# Patient Record
Sex: MALE | Race: WHITE | URBAN - METROPOLITAN AREA
[De-identification: names, ages, dates, MRNs, and addresses within clinical notes are randomized per-mention and may not be internally consistent; named-entity substitution may affect disease eponyms.]

---

## 2017-12-14 ENCOUNTER — APPOINTMENT (OUTPATIENT)
Dept: CT IMAGING | Facility: CLINIC | Age: 82
DRG: 378 | End: 2017-12-14
Attending: EMERGENCY MEDICINE
Payer: MEDICARE

## 2017-12-14 ENCOUNTER — HOSPITAL ENCOUNTER (INPATIENT)
Facility: CLINIC | Age: 82
LOS: 2 days | Discharge: HOME | DRG: 378 | End: 2017-12-16
Attending: EMERGENCY MEDICINE | Admitting: INTERNAL MEDICINE
Payer: MEDICARE

## 2017-12-14 DIAGNOSIS — K92.0 HEMATEMESIS, PRESENCE OF NAUSEA NOT SPECIFIED: Primary | ICD-10-CM

## 2017-12-14 DIAGNOSIS — K92.2 UPPER GI BLEED: ICD-10-CM

## 2017-12-14 DIAGNOSIS — K25.4 GASTROINTESTINAL HEMORRHAGE ASSOCIATED WITH GASTRIC ULCER: ICD-10-CM

## 2017-12-14 DIAGNOSIS — Z86.03 HX OF POLYCYTHEMIA VERA: ICD-10-CM

## 2017-12-14 LAB
ABO + RH BLD: NORMAL
ABO + RH BLD: NORMAL
ALBUMIN SERPL-MCNC: 3.4 G/DL (ref 3.4–5)
ALP SERPL-CCNC: 64 U/L (ref 40–150)
ALT SERPL W P-5'-P-CCNC: 23 U/L (ref 0–70)
ANION GAP SERPL CALCULATED.3IONS-SCNC: 8 MMOL/L (ref 3–14)
AST SERPL W P-5'-P-CCNC: 16 U/L (ref 0–45)
BASOPHILS # BLD AUTO: 0.1 10E9/L (ref 0–0.2)
BASOPHILS NFR BLD AUTO: 0.4 %
BILIRUB DIRECT SERPL-MCNC: <0.1 MG/DL (ref 0–0.2)
BILIRUB SERPL-MCNC: 0.4 MG/DL (ref 0.2–1.3)
BLD GP AB SCN SERPL QL: NORMAL
BLOOD BANK CMNT PATIENT-IMP: NORMAL
BUN SERPL-MCNC: 58 MG/DL (ref 7–30)
CALCIUM SERPL-MCNC: 8.9 MG/DL (ref 8.5–10.1)
CHLORIDE SERPL-SCNC: 106 MMOL/L (ref 94–109)
CO2 SERPL-SCNC: 27 MMOL/L (ref 20–32)
CREAT SERPL-MCNC: 0.89 MG/DL (ref 0.66–1.25)
DIFFERENTIAL METHOD BLD: ABNORMAL
EOSINOPHIL # BLD AUTO: 0.5 10E9/L (ref 0–0.7)
EOSINOPHIL NFR BLD AUTO: 1.4 %
ERYTHROCYTE [DISTWIDTH] IN BLOOD BY AUTOMATED COUNT: 17.3 % (ref 10–15)
GFR SERPL CREATININE-BSD FRML MDRD: 79 ML/MIN/1.7M2
GLUCOSE BLDC GLUCOMTR-MCNC: 162 MG/DL (ref 70–99)
GLUCOSE SERPL-MCNC: 211 MG/DL (ref 70–99)
HCT VFR BLD AUTO: 39.7 % (ref 40–53)
HGB BLD-MCNC: 11.6 G/DL (ref 13.3–17.7)
IMM GRANULOCYTES # BLD: 0.7 10E9/L (ref 0–0.4)
IMM GRANULOCYTES NFR BLD: 2.1 %
INR PPP: 3.19 (ref 0.86–1.14)
LACTATE BLD-SCNC: 1.8 MMOL/L (ref 0.7–2)
LYMPHOCYTES # BLD AUTO: 2.5 10E9/L (ref 0.8–5.3)
LYMPHOCYTES NFR BLD AUTO: 7.5 %
MCH RBC QN AUTO: 20.8 PG (ref 26.5–33)
MCHC RBC AUTO-ENTMCNC: 29.2 G/DL (ref 31.5–36.5)
MCV RBC AUTO: 71 FL (ref 78–100)
MONOCYTES # BLD AUTO: 1.4 10E9/L (ref 0–1.3)
MONOCYTES NFR BLD AUTO: 4.2 %
NEUTROPHILS # BLD AUTO: 27.7 10E9/L (ref 1.6–8.3)
NEUTROPHILS NFR BLD AUTO: 84.4 %
NRBC # BLD AUTO: 0 10*3/UL
NRBC BLD AUTO-RTO: 0 /100
PLATELET # BLD AUTO: 764 10E9/L (ref 150–450)
POTASSIUM SERPL-SCNC: 5 MMOL/L (ref 3.4–5.3)
PROT SERPL-MCNC: 6.6 G/DL (ref 6.8–8.8)
RBC # BLD AUTO: 5.58 10E12/L (ref 4.4–5.9)
SODIUM SERPL-SCNC: 141 MMOL/L (ref 133–144)
SPECIMEN EXP DATE BLD: NORMAL
WBC # BLD AUTO: 32.8 10E9/L (ref 4–11)

## 2017-12-14 PROCEDURE — 86901 BLOOD TYPING SEROLOGIC RH(D): CPT | Performed by: EMERGENCY MEDICINE

## 2017-12-14 PROCEDURE — 25000128 H RX IP 250 OP 636: Performed by: INTERNAL MEDICINE

## 2017-12-14 PROCEDURE — 80076 HEPATIC FUNCTION PANEL: CPT | Performed by: EMERGENCY MEDICINE

## 2017-12-14 PROCEDURE — 96365 THER/PROPH/DIAG IV INF INIT: CPT

## 2017-12-14 PROCEDURE — 96361 HYDRATE IV INFUSION ADD-ON: CPT

## 2017-12-14 PROCEDURE — 74177 CT ABD & PELVIS W/CONTRAST: CPT

## 2017-12-14 PROCEDURE — 25000125 ZZHC RX 250: Performed by: EMERGENCY MEDICINE

## 2017-12-14 PROCEDURE — 86850 RBC ANTIBODY SCREEN: CPT | Performed by: EMERGENCY MEDICINE

## 2017-12-14 PROCEDURE — 86900 BLOOD TYPING SEROLOGIC ABO: CPT | Performed by: EMERGENCY MEDICINE

## 2017-12-14 PROCEDURE — 00000146 ZZHCL STATISTIC GLUCOSE BY METER IP

## 2017-12-14 PROCEDURE — 99223 1ST HOSP IP/OBS HIGH 75: CPT | Mod: AI | Performed by: INTERNAL MEDICINE

## 2017-12-14 PROCEDURE — 80048 BASIC METABOLIC PNL TOTAL CA: CPT | Performed by: EMERGENCY MEDICINE

## 2017-12-14 PROCEDURE — 99285 EMERGENCY DEPT VISIT HI MDM: CPT | Mod: 25

## 2017-12-14 PROCEDURE — 85025 COMPLETE CBC W/AUTO DIFF WBC: CPT | Performed by: EMERGENCY MEDICINE

## 2017-12-14 PROCEDURE — 12000000 ZZH R&B MED SURG/OB

## 2017-12-14 PROCEDURE — 25000128 H RX IP 250 OP 636

## 2017-12-14 PROCEDURE — 83605 ASSAY OF LACTIC ACID: CPT | Performed by: EMERGENCY MEDICINE

## 2017-12-14 PROCEDURE — 96375 TX/PRO/DX INJ NEW DRUG ADDON: CPT

## 2017-12-14 PROCEDURE — 85610 PROTHROMBIN TIME: CPT | Performed by: EMERGENCY MEDICINE

## 2017-12-14 PROCEDURE — 25000128 H RX IP 250 OP 636: Performed by: EMERGENCY MEDICINE

## 2017-12-14 RX ORDER — POTASSIUM CHLORIDE 1.5 G/1.58G
20-40 POWDER, FOR SOLUTION ORAL
Status: DISCONTINUED | OUTPATIENT
Start: 2017-12-14 | End: 2017-12-16 | Stop reason: HOSPADM

## 2017-12-14 RX ORDER — AMOXICILLIN 250 MG
2 CAPSULE ORAL 2 TIMES DAILY PRN
Status: DISCONTINUED | OUTPATIENT
Start: 2017-12-14 | End: 2017-12-16 | Stop reason: HOSPADM

## 2017-12-14 RX ORDER — MORPHINE SULFATE 2 MG/ML
1 INJECTION, SOLUTION INTRAMUSCULAR; INTRAVENOUS
Status: DISCONTINUED | OUTPATIENT
Start: 2017-12-14 | End: 2017-12-16 | Stop reason: HOSPADM

## 2017-12-14 RX ORDER — ACETAMINOPHEN 325 MG/1
650 TABLET ORAL EVERY 4 HOURS PRN
Status: DISCONTINUED | OUTPATIENT
Start: 2017-12-14 | End: 2017-12-16 | Stop reason: HOSPADM

## 2017-12-14 RX ORDER — POTASSIUM CL/LIDO/0.9 % NACL 10MEQ/0.1L
10 INTRAVENOUS SOLUTION, PIGGYBACK (ML) INTRAVENOUS
Status: DISCONTINUED | OUTPATIENT
Start: 2017-12-14 | End: 2017-12-16 | Stop reason: HOSPADM

## 2017-12-14 RX ORDER — ONDANSETRON 4 MG/1
4 TABLET, ORALLY DISINTEGRATING ORAL EVERY 6 HOURS PRN
Status: DISCONTINUED | OUTPATIENT
Start: 2017-12-14 | End: 2017-12-16 | Stop reason: HOSPADM

## 2017-12-14 RX ORDER — LABETALOL HYDROCHLORIDE 5 MG/ML
10 INJECTION, SOLUTION INTRAVENOUS
Status: DISCONTINUED | OUTPATIENT
Start: 2017-12-14 | End: 2017-12-16 | Stop reason: HOSPADM

## 2017-12-14 RX ORDER — DEXTROSE MONOHYDRATE 25 G/50ML
25-50 INJECTION, SOLUTION INTRAVENOUS
Status: DISCONTINUED | OUTPATIENT
Start: 2017-12-14 | End: 2017-12-16 | Stop reason: HOSPADM

## 2017-12-14 RX ORDER — ONDANSETRON 2 MG/ML
4 INJECTION INTRAMUSCULAR; INTRAVENOUS EVERY 6 HOURS PRN
Status: DISCONTINUED | OUTPATIENT
Start: 2017-12-14 | End: 2017-12-16 | Stop reason: HOSPADM

## 2017-12-14 RX ORDER — POLYETHYLENE GLYCOL 3350 17 G/17G
17 POWDER, FOR SOLUTION ORAL DAILY PRN
Status: DISCONTINUED | OUTPATIENT
Start: 2017-12-14 | End: 2017-12-16 | Stop reason: HOSPADM

## 2017-12-14 RX ORDER — SODIUM CHLORIDE 9 MG/ML
INJECTION, SOLUTION INTRAVENOUS CONTINUOUS
Status: DISCONTINUED | OUTPATIENT
Start: 2017-12-14 | End: 2017-12-16

## 2017-12-14 RX ORDER — NICOTINE POLACRILEX 4 MG
15-30 LOZENGE BUCCAL
Status: DISCONTINUED | OUTPATIENT
Start: 2017-12-14 | End: 2017-12-16 | Stop reason: HOSPADM

## 2017-12-14 RX ORDER — NALOXONE HYDROCHLORIDE 0.4 MG/ML
.1-.4 INJECTION, SOLUTION INTRAMUSCULAR; INTRAVENOUS; SUBCUTANEOUS
Status: DISCONTINUED | OUTPATIENT
Start: 2017-12-14 | End: 2017-12-16 | Stop reason: HOSPADM

## 2017-12-14 RX ORDER — POTASSIUM CHLORIDE 7.45 MG/ML
10 INJECTION INTRAVENOUS
Status: DISCONTINUED | OUTPATIENT
Start: 2017-12-14 | End: 2017-12-16 | Stop reason: HOSPADM

## 2017-12-14 RX ORDER — POTASSIUM CHLORIDE 1500 MG/1
20-40 TABLET, EXTENDED RELEASE ORAL
Status: DISCONTINUED | OUTPATIENT
Start: 2017-12-14 | End: 2017-12-16 | Stop reason: HOSPADM

## 2017-12-14 RX ORDER — AMOXICILLIN 250 MG
1 CAPSULE ORAL 2 TIMES DAILY PRN
Status: DISCONTINUED | OUTPATIENT
Start: 2017-12-14 | End: 2017-12-16 | Stop reason: HOSPADM

## 2017-12-14 RX ORDER — PROCHLORPERAZINE MALEATE 5 MG
5 TABLET ORAL EVERY 6 HOURS PRN
Status: DISCONTINUED | OUTPATIENT
Start: 2017-12-14 | End: 2017-12-16 | Stop reason: HOSPADM

## 2017-12-14 RX ORDER — IOPAMIDOL 755 MG/ML
75 INJECTION, SOLUTION INTRAVASCULAR ONCE
Status: COMPLETED | OUTPATIENT
Start: 2017-12-14 | End: 2017-12-14

## 2017-12-14 RX ORDER — ACETAMINOPHEN 650 MG/1
650 SUPPOSITORY RECTAL EVERY 4 HOURS PRN
Status: DISCONTINUED | OUTPATIENT
Start: 2017-12-14 | End: 2017-12-16 | Stop reason: HOSPADM

## 2017-12-14 RX ORDER — PROCHLORPERAZINE 25 MG
12.5 SUPPOSITORY, RECTAL RECTAL EVERY 12 HOURS PRN
Status: DISCONTINUED | OUTPATIENT
Start: 2017-12-14 | End: 2017-12-16 | Stop reason: HOSPADM

## 2017-12-14 RX ORDER — METOPROLOL TARTRATE 1 MG/ML
2.5 INJECTION, SOLUTION INTRAVENOUS EVERY 4 HOURS PRN
Status: DISCONTINUED | OUTPATIENT
Start: 2017-12-14 | End: 2017-12-16 | Stop reason: HOSPADM

## 2017-12-14 RX ORDER — POTASSIUM CHLORIDE 29.8 MG/ML
20 INJECTION INTRAVENOUS
Status: DISCONTINUED | OUTPATIENT
Start: 2017-12-14 | End: 2017-12-14 | Stop reason: RX

## 2017-12-14 RX ADMIN — PANTOPRAZOLE SODIUM 40 MG: 40 INJECTION, POWDER, FOR SOLUTION INTRAVENOUS at 20:15

## 2017-12-14 RX ADMIN — IOPAMIDOL 75 ML: 755 INJECTION, SOLUTION INTRAVENOUS at 21:04

## 2017-12-14 RX ADMIN — SODIUM CHLORIDE 63 ML: 9 INJECTION, SOLUTION INTRAVENOUS at 21:05

## 2017-12-14 RX ADMIN — SODIUM CHLORIDE 1000 ML: 9 INJECTION, SOLUTION INTRAVENOUS at 19:54

## 2017-12-14 RX ADMIN — SODIUM CHLORIDE: 9 INJECTION, SOLUTION INTRAVENOUS at 22:40

## 2017-12-14 RX ADMIN — PHYTONADIONE 2 MG: 2 INJECTION, EMULSION INTRAMUSCULAR; INTRAVENOUS; SUBCUTANEOUS at 21:46

## 2017-12-14 ASSESSMENT — ENCOUNTER SYMPTOMS
HEADACHES: 0
SHORTNESS OF BREATH: 1
NAUSEA: 0
DIARRHEA: 1
VOMITING: 1
FEVER: 0
ABDOMINAL PAIN: 1
DIAPHORESIS: 1

## 2017-12-14 NOTE — IP AVS SNAPSHOT
MRN:[id number]                      After Visit Summary   12/14/2017    Dagoberto Rankin    MRN: [id number]           Thank you!     Thank you for choosing [facility] for your care. Our goal is always to provide you with excellent care. Hearing back from our patients is one way we can continue to improve our services. Please take a few minutes to complete the written survey that you may receive in the mail after you visit with us. Thank you!        Patient Information     Date Of Birth          8/11/1920        Designated Caregiver       Most Recent Value    Caregiver    Will someone help with your care after discharge? yes    Name of designated caregiver Coleman    Phone number of caregiver [phone]    Caregiver address [place]      About your hospital stay     You were admitted on:  December 14, 2017 You last received care in the:  [address]    You were discharged on:  December 16, 2017        Reason for your hospital stay       For treatment of upper intestinal bleeding due to a gastric ulcer.                  Who to Call     For medical emergencies, please call 911.  For non-urgent questions about your medical care, please call your primary care provider or clinic, None  For questions related to your surgery, please call your surgery clinic        Attending Provider     Provider Specialty    Madhav Mathur MD Emergency Medicine    Waleska Keene MD Internal Medicine       Primary Care Provider Fax #    Provider Not In System [phone]      After Care Instructions     Activity       Your activity upon discharge: activity as tolerated            Diet       Follow this diet upon discharge: Orders Placed This Encounter      Advance Diet as Tolerated: Full Liquid Diet                  Follow-up Appointments     Follow-up and recommended labs and tests        Follow up with primary care provider, Provider Not In System, within 7 days for hospital follow- up.  The following  "labs/tests are recommended: CBC.                  Further instructions from your care team       Can resume regular diet if he tolerates full liquids well.     Pending Results     Date and Time Order Name Status Description    12/15/2017 0420 EKG 12-lead, tracing only Preliminary             Statement of Approval     Ordered          17 0919  I have reviewed and agree with all the recommendations and orders detailed in this document.  EFFECTIVE NOW     Approved and electronically signed by:  Naveen Morejon MD             Admission Information     Date & Time Provider Department Dept. Phone    2017 Waleska Keene MD [place] [address] Oncology [phone]      Your Vitals Were     Blood Pressure Pulse Temperature Respirations Height Weight    130/66 (BP Location: Left arm) 82 96.6  F (35.9  C) (Oral) 16 1.6 m (5' 3\") 68 kg (150 lb)    Pulse Oximetry BMI (Body Mass Index)                95% 26.57 kg/m2          MyChart Information     [facility] lets you send messages to your doctor, view your test results, renew your prescriptions, schedule appointments and more. To sign up, go to [email or web address] . Click on \"Log in\" on the left side of the screen, which will take you to the Welcome page. Then click on \"Sign up Now\" on the right side of the page.     You will be asked to enter the access code listed below, as well as some personal information. Please follow the directions to create your username and password.     Your access code is: [email or web address]  Expires: 3/16/2018 10:16 AM     Your access code will  in 90 days. If you need help or a new code, please call your [facility] clinic or [phone].        Care EveryWhere ID     This is your Care EveryWhere ID. This could be used by other organizations to access your [facility] medical records  [id number]        Equal Access to Services     ALEA LOTT AH: Judith Griffiths, johnsonda patty, qaybta kakinza anthony, kamila " david monreal alice sweeney'aan ah. So [facility] [phone].    ATENCIÓN: Si habla sherieañol, tiene a hill disposición servicios gratuitos de asistencia lingüística. [place] al [phone].    We comply with applicable federal civil rights laws and [place] laws. We do not discriminate on the basis of race, color, national origin, age, disability, sex, sexual orientation, or gender identity.               Review of your medicines      START taking        Dose / Directions    pantoprazole 40 MG EC tablet   Commonly known as:  PROTONIX   Used for:  Gastrointestinal hemorrhage associated with gastric ulcer        Dose:  40 mg   Take 1 tablet (40 mg) by mouth every morning   Quantity:  30 tablet   Refills:  0         CONTINUE these medicines which have NOT CHANGED        Dose / Directions    METFORMIN HCL PO        Dose:  500 mg   Take 500 mg by mouth daily (with breakfast)   Refills:  0       PROSCAR PO        Dose:  5 mg   Take 5 mg by mouth daily   Refills:  0       TOPROL XL PO        Dose:  25 mg   Take 25 mg by mouth daily   Refills:  0       VITAMIN D (CHOLECALCIFEROL) PO        Dose:  2000 Units   Take 2,000 Units by mouth daily   Refills:  0       ZOLOFT PO        Dose:  50 mg   Take 50 mg by mouth   Refills:  0         STOP taking     aspirin 81 MG EC tablet           COUMADIN PO                Where to get your medicines      These medications were sent to [facility] - [address]  [address], [place] [address]     Phone:  [phone]     pantoprazole 40 MG EC tablet                Protect others around you: Learn how to safely use, store and throw away your medicines at www.disposemymeds.org.             Medication List: This is a list of all your medications and when to take them. Check marks below indicate your daily home schedule. Keep this list as a reference.      Medications           Morning Afternoon Evening Bedtime As Needed    METFORMIN HCL PO   Take 500  mg by mouth daily (with breakfast)   Next Dose Due:  Resume home meds                                pantoprazole 40 MG EC tablet   Commonly known as:  PROTONIX   Take 1 tablet (40 mg) by mouth every morning   Last time this was given:  40 mg on 12/16/2017  9:52 AM   Next Dose Due:  12/17/17                                   PROSCAR PO   Take 5 mg by mouth daily   Next Dose Due:  Resume home meds                                TOPROL XL PO   Take 25 mg by mouth daily   Next Dose Due:  Resume home meds                                VITAMIN D (CHOLECALCIFEROL) PO   Take 2,000 Units by mouth daily   Next Dose Due:  Resume home meds                                ZOLOFT PO   Take 50 mg by mouth   Last time this was given:  50 mg on 12/16/2017  9:52 AM   Next Dose Due:  12/17/17                                             More Information        Discharge Instructions: Eating a Full Liquid Diet  Your healthcare provider prescribed a full liquid diet temporarily for you. You may have trouble swallowing solid foods. Or, you may have had surgery and not be ready for solid food or need to advance to solid foods gradually. Here's what you need to know about this type of diet.  Home care    Remember, this diet is temporary. You should not follow this diet longer than directed because it doesn t provide you with enough energy or protein.    Contact your healthcare provider if you are on this diet for more than 5 days. You may need nutritional or vitamin supplements.    Keep track of the amount of liquid that you drink and anything you eat while on this diet. Keep a log for your healthcare provider.  Choose these foods    Choose fruit juices without pulp, such as apple juice, grape juice, cranberry juice, and nectars.    Choose drinks such as coffee, tea (hot or cold), fruit flavored drinks, soda, water, milk (whole, skim, 1%, and 2%), cream, instant breakfast drinks, and liquid meal replacements.    Choose desserts and  snacks such as fruit ices (without chunks of fruit), plain or vanilla yogurt, plain gelatin, hard candy, Popsicles made from juices, custards, frozen yogurt, smoothies without chunks, ice cream (without nuts or candy), and pudding.    Choose broth, bouillon, fat-free consommé, or strained cream soups.    Choose thin, refined hot cereals, such as porridge, and grits.  Don't eat these foods    Don t eat canned, fresh, or frozen fruit.    Don t eat soup with vegetables, noodles, rice, meat, or other chunks of food in it.    Don t eat vegetables, bread, whole cereal and grain products, meat, chicken, fish, meat substitutes (nuts, tofu, etc.), oils, butter, or margarine.  Follow-up  Make a follow-up appointment, or as advised.     When to call your healthcare provider  Call your healthcare provider right away if you have any of the following:    Fever of 100.4 F (38.0 C) or higher    Diarrhea that lasts for more than 1 day    Vomiting that does not stop    Trouble urinating    Trouble passing gas    Abdominal pain with bloating and cramping   Date Last Reviewed: 6/1/2017 2000-2017 [employer]. [address] [place] [address]. All rights reserved. This information is not intended as a substitute for professional medical care. Always follow your healthcare professional's instructions.                Full Liquid Diet  A full liquid diet is a middle step between a clear liquid diet and eating solid foods. A clear liquid diet allows only liquids you can see through. A full liquid diet allows thicker, liquid foods as listed below. It can be anything that is liquid at room temperature.  The full liquid diet may be used before or after surgery. It is also used before some medical tests. It is easy to digest and leaves little food in the stomach and intestines.  A full liquid diet meets calorie and protein needs for your body with liquids only. It should not be used for more than 5 days. Your healthcare  provider or dietitian may also order high-protein, high-calorie liquid supplements. These will give you extra vitamins and minerals. You may include the items below on a full liquid diet.    Adults  Adults should drink a total of 2 to 3 quarts of liquid per day. It may be easier to drink small, frequent servings rather than a few large ones. People with severe kidney or heart disease can drink only limited amounts of fluids. Check with your provider.    Cereals and soups. Creamy, hot breakfast cereals (wheat or rice), pureed soups (including pureed meats, bland vegetables, and white potatoes), tomato puree.    Desserts. Gelatin, whipped topping, custard-style yogurt, pudding, custard, plain ice cream, sherbet, sorbet, popsicles, fruit juice bars.    Drinks. Coffee, tea, cream, milk, milkshakes, fruit and vegetable juices, sodas, mineral water (plain or flavored), liquid gelatin, electrolyte replacement sport drinks.    Other items. Salt, mild-flavored seasonings, chocolate flavoring, gravy, margarine, sugar, syrup, jelly, honey, hard candy (to suck on).  Children  Follow the healthcare provider s instructions. Young children who are eating solid foods may be able to have the items listed above without problems. Be sure to follow these safety measures:    Don't give hard candies to young children. The candies may cause choking.    Children under 1 year old. Do not give honey. It may cause illness.    Children under 2 years old. Ask your child s provider if you should supplement your child s diet with oral rehydration solutions, which have electrolytes. You can buy these drinks at pharmacies and grocery stores. You don t need a prescription.    Children over 1 year old. Limit milk to 3 or 4 cups per day. Too much milk can make your child less hungry for other foods.  Date Last Reviewed: 8/1/2016 2000-2017 [employer]. [address] [place] [address], [place] [address]. All rights reserved. This information is  not intended as a substitute for professional medical care. Always follow your healthcare professional's instructions.                Upper GI Bleeding (Stable)  Your upper gastrointestinal (GI) tract includes your esophagus, stomach, and upper small intestine. You have signs of bleeding from your upper GI tract. You may have vomited or coughed up blood or coffee-ground like material. Or you may have black or tarry stools. Very small amounts of GI bleeding may not be visible and can only be found by a test of the stool.  Causes of upper GI bleeding can include:    Tear in the lining of the esophagus    Enlarged veins in the esophagus    An ulcer in the stomach or top of the small intestine    Severe irritation of the stomach    Inflammation of the digestive tract  Note: A bloody nose or mouth or dental problems may cause blood to be swallowed and vomited up again. This is not true GI bleeding. Iron supplements and medicines for diarrhea and upset stomach can cause black stools. This is not GI bleeding and is not a cause for concern.  Home care  Depending on the cause of your bleeding, care may include the following:    You may be given medicines to help protect your GI tract, treat your problem, and promote healing. Take these as directed.    Avoid NSAIDs, such as aspirin, ibuprofen, or naproxen. They can irritate the stomach and cause further bleeding. If you are taking these medicines for other medical reasons, talk to your healthcare provider before you stop them.      Avoid alcohol, caffeine, and tobacco, which can delay healing and worsen your problem.  Follow-up care  Follow up with your healthcare provider as advised. Further tests may need to be done to find the cause of your bleeding.  When to seek medical advice  Call your healthcare provider for any of the following:    Stomach pain appears or gets worse    Pain spreads to the neck, back, shoulder, or arm    Weakness or dizziness    Swelling of your  abdomen    Red blood in your stool    Fever of 100.4F (38C) or higher, or as directed by your healthcare provider  Call 911  Get emergency medical care if any of these occur:    Trouble breathing or swallowing    Severe dizziness    Loss of consciousness    Vomiting blood or large amounts of blood in the stool  Date Last Reviewed: 6/24/2015 2000-2017 [employer]. [address] [place], [place] [address]. All rights reserved. This information is not intended as a substitute for professional medical care. Always follow your healthcare professional's instructions.                Understanding Gastric Ulcers    A gastric ulcer is an open sore in the stomach lining. It is sometimes called a peptic ulcer. This is a more general term for ulcers that may be in the stomach or the upper part of the small intestine. Ulcers can cause pain. But they may also have no symptoms for a long time.  What causes gastric ulcers?  Gastric ulcers have a few common causes. To find the cause of your ulcer, your healthcare provider will give you an exam and take your health history. He or she may also order some tests. The main causes of gastric ulcers include:    Infection with the H. pylori (Helicobacter pylori) bacteria. This damages the stomach lining. Digestive juices can then harm the digestive tract.    Long-term use of some over-the-counter pain medicines. This reduces the body s ability to protect the stomach from damage.  Other causes of gastric ulcers include:    Heavy alcohol use    Having a family history of ulcers    In rare cases, a tumor in the digestive tract may cause an ulcer  Symptoms of a gastric ulcer  Ulcer symptoms may appear and then go away for a time. Symptoms of a gastric ulcer may include:    Stomach pain, often a dull or burning feeling toward the top of your belly    Feeling full or bloated    Heartburn or acid reflux    Upset stomach (nausea) or vomiting    Vomiting blood    Lack of  appetite    Weight loss    Black stool    Red blood in the stool  Treatment for a gastric ulcer  Treatment for gastric ulcers may depend on what is causing them. Treatment may include:    Not using over-the-counter medicines. You will likely need to stop taking these medicines. But in some cases these medicines can t be safely stopped. Check with your healthcare provider to see what is best for you.     Taking medicines to ease symptoms. These medicines may help to reduce the amount of acid your stomach makes. They also may help coat your stomach lining.    Taking antibiotics. If your ulcer was caused by H. pylori, your provider will likely prescribe antibiotics to get rid of the infection.    Having an endoscopy. This is often done to check the stomach and diagnose the ulcer. In some cases it can also treat the ulcer. It involves passing a flexible tube through your mouth into your stomach and small intestine.    Using a tube (catheter) to stop the bleeding. A thin tube is passed into one of your blood vessels. Special tools are used to help stop the bleeding.    Having surgery. You often may need this if the ulcer has caused severe symptoms.  Making some lifestyle changes can reduce ulcer symptoms. It may also prevent more damage to your digestive tract. These changes include:    Not taking over-the-counter pain medicines. Talk with your provider about using another type of pain reliever.    Not taking aspirin unless your provider has advised it    Limiting the amount of alcohol you drink    Quitting smoking  Possible complications of a gastric ulcer  Gastric ulcers can have serious complications. These can include:    Bleeding into the stomach    A hole (perforation) in the stomach    A blockage that interferes with food moving from your stomach to the small intestine  An ongoing infection with H. pylori may be a risk factor for stomach cancer. This is one reason it is important to get rid of this bacteria.  When  to call your healthcare provider  Call your healthcare provider right away if you have any of these:    Vomiting blood, or vomit that looks like coffee grounds    Bloody, black, or tarry-looking stools    Fever of 100.4 F (38 C) or higher, or as directed    Pain that gets worse    Symptoms that don t get better with treatment, or symptoms that get worse    New symptoms   Date Last Reviewed: 5/1/2016 2000-2017 [employer]. [address] [place] [address], [place] [address]. All rights reserved. This information is not intended as a substitute for professional medical care. Always follow your healthcare professional's instructions.

## 2017-12-14 NOTE — IP AVS SNAPSHOT
[address], [address]    [facility] [address]    Phone:  [phone]                                       After Visit Summary   12/14/2017    Dagoberto Rankin    MRN: [id number]           After Visit Summary Signature Page     I have received my discharge instructions, and my questions have been answered. I have discussed any challenges I see with this plan with the nurse or doctor.    ..........................................................................................................................................  Patient/Patient Representative Signature      ..........................................................................................................................................  Patient Representative Print Name and Relationship to Patient    ..................................................               ................................................  Date                                            Time    ..........................................................................................................................................  Reviewed by Signature/Title    ...................................................              ..............................................  Date                                                            Time

## 2017-12-15 ENCOUNTER — SURGERY (OUTPATIENT)
Age: 82
End: 2017-12-15

## 2017-12-15 LAB
ANION GAP SERPL CALCULATED.3IONS-SCNC: 7 MMOL/L (ref 3–14)
BUN SERPL-MCNC: 57 MG/DL (ref 7–30)
CALCIUM SERPL-MCNC: 8.5 MG/DL (ref 8.5–10.1)
CHLORIDE SERPL-SCNC: 113 MMOL/L (ref 94–109)
CO2 SERPL-SCNC: 24 MMOL/L (ref 20–32)
CREAT SERPL-MCNC: 0.72 MG/DL (ref 0.66–1.25)
ERYTHROCYTE [DISTWIDTH] IN BLOOD BY AUTOMATED COUNT: 17 % (ref 10–15)
GFR SERPL CREATININE-BSD FRML MDRD: >90 ML/MIN/1.7M2
GLUCOSE BLDC GLUCOMTR-MCNC: 137 MG/DL (ref 70–99)
GLUCOSE BLDC GLUCOMTR-MCNC: 148 MG/DL (ref 70–99)
GLUCOSE BLDC GLUCOMTR-MCNC: 158 MG/DL (ref 70–99)
GLUCOSE BLDC GLUCOMTR-MCNC: 161 MG/DL (ref 70–99)
GLUCOSE BLDC GLUCOMTR-MCNC: 172 MG/DL (ref 70–99)
GLUCOSE BLDC GLUCOMTR-MCNC: 223 MG/DL (ref 70–99)
GLUCOSE SERPL-MCNC: 159 MG/DL (ref 70–99)
HBA1C MFR BLD: 7 % (ref 4.3–6)
HCT VFR BLD AUTO: 39.8 % (ref 40–53)
HGB BLD-MCNC: 10.2 G/DL (ref 13.3–17.7)
HGB BLD-MCNC: 10.7 G/DL (ref 13.3–17.7)
HGB BLD-MCNC: 11.8 G/DL (ref 13.3–17.7)
HGB BLD-MCNC: 9.9 G/DL (ref 13.3–17.7)
INR PPP: 1.56 (ref 0.86–1.14)
MCH RBC QN AUTO: 20.6 PG (ref 26.5–33)
MCHC RBC AUTO-ENTMCNC: 29.6 G/DL (ref 31.5–36.5)
MCV RBC AUTO: 70 FL (ref 78–100)
PLATELET # BLD AUTO: 620 10E9/L (ref 150–450)
POTASSIUM SERPL-SCNC: 4.4 MMOL/L (ref 3.4–5.3)
RBC # BLD AUTO: 5.73 10E12/L (ref 4.4–5.9)
SODIUM SERPL-SCNC: 144 MMOL/L (ref 133–144)
UPPER GI ENDOSCOPY: NORMAL
WBC # BLD AUTO: 27.8 10E9/L (ref 4–11)

## 2017-12-15 PROCEDURE — 25000128 H RX IP 250 OP 636: Performed by: INTERNAL MEDICINE

## 2017-12-15 PROCEDURE — 00000146 ZZHCL STATISTIC GLUCOSE BY METER IP

## 2017-12-15 PROCEDURE — 85610 PROTHROMBIN TIME: CPT | Performed by: INTERNAL MEDICINE

## 2017-12-15 PROCEDURE — 25000131 ZZH RX MED GY IP 250 OP 636 PS 637: Performed by: INTERNAL MEDICINE

## 2017-12-15 PROCEDURE — 85027 COMPLETE CBC AUTOMATED: CPT | Performed by: INTERNAL MEDICINE

## 2017-12-15 PROCEDURE — 83036 HEMOGLOBIN GLYCOSYLATED A1C: CPT | Performed by: INTERNAL MEDICINE

## 2017-12-15 PROCEDURE — 93010 ELECTROCARDIOGRAM REPORT: CPT | Performed by: INTERNAL MEDICINE

## 2017-12-15 PROCEDURE — 25000132 ZZH RX MED GY IP 250 OP 250 PS 637: Performed by: INTERNAL MEDICINE

## 2017-12-15 PROCEDURE — 99233 SBSQ HOSP IP/OBS HIGH 50: CPT | Performed by: HOSPITALIST

## 2017-12-15 PROCEDURE — 25000125 ZZHC RX 250: Performed by: INTERNAL MEDICINE

## 2017-12-15 PROCEDURE — 43255 EGD CONTROL BLEEDING ANY: CPT | Performed by: INTERNAL MEDICINE

## 2017-12-15 PROCEDURE — 40000275 ZZH STATISTIC RCP TIME EA 10 MIN

## 2017-12-15 PROCEDURE — G0500 MOD SEDAT ENDO SERVICE >5YRS: HCPCS | Performed by: INTERNAL MEDICINE

## 2017-12-15 PROCEDURE — 93005 ELECTROCARDIOGRAM TRACING: CPT

## 2017-12-15 PROCEDURE — 12000000 ZZH R&B MED SURG/OB

## 2017-12-15 PROCEDURE — 80048 BASIC METABOLIC PNL TOTAL CA: CPT | Performed by: INTERNAL MEDICINE

## 2017-12-15 PROCEDURE — 0W3P8ZZ CONTROL BLEEDING IN GASTROINTESTINAL TRACT, VIA NATURAL OR ARTIFICIAL OPENING ENDOSCOPIC: ICD-10-PCS | Performed by: INTERNAL MEDICINE

## 2017-12-15 PROCEDURE — 36415 COLL VENOUS BLD VENIPUNCTURE: CPT | Performed by: INTERNAL MEDICINE

## 2017-12-15 PROCEDURE — 85018 HEMOGLOBIN: CPT | Performed by: INTERNAL MEDICINE

## 2017-12-15 RX ORDER — LIDOCAINE 40 MG/G
CREAM TOPICAL
Status: DISCONTINUED | OUTPATIENT
Start: 2017-12-15 | End: 2017-12-15 | Stop reason: HOSPADM

## 2017-12-15 RX ADMIN — PANTOPRAZOLE SODIUM 40 MG: 40 INJECTION, POWDER, FOR SOLUTION INTRAVENOUS at 20:00

## 2017-12-15 RX ADMIN — MIDAZOLAM 1 MG: 1 INJECTION INTRAMUSCULAR; INTRAVENOUS at 17:06

## 2017-12-15 RX ADMIN — INSULIN ASPART 2 UNITS: 100 INJECTION, SOLUTION INTRAVENOUS; SUBCUTANEOUS at 19:55

## 2017-12-15 RX ADMIN — SERTRALINE HYDROCHLORIDE 50 MG: 50 TABLET ORAL at 08:49

## 2017-12-15 RX ADMIN — SODIUM CHLORIDE: 9 INJECTION, SOLUTION INTRAVENOUS at 03:37

## 2017-12-15 RX ADMIN — SODIUM CHLORIDE: 9 INJECTION, SOLUTION INTRAVENOUS at 13:52

## 2017-12-15 RX ADMIN — PANTOPRAZOLE SODIUM 40 MG: 40 INJECTION, POWDER, FOR SOLUTION INTRAVENOUS at 08:49

## 2017-12-15 ASSESSMENT — ACTIVITIES OF DAILY LIVING (ADL)
RETIRED_COMMUNICATION: 0-->UNDERSTANDS/COMMUNICATES WITHOUT DIFFICULTY
COGNITION: 0 - NO COGNITION ISSUES REPORTED
SWALLOWING: 0-->SWALLOWS FOODS/LIQUIDS WITHOUT DIFFICULTY
AMBULATION: 2-->ASSISTIVE PERSON
TOILETING: 0-->INDEPENDENT
BATHING: 2-->ASSISTIVE PERSON
TRANSFERRING: 2-->ASSISTIVE PERSON
RETIRED_EATING: 0-->INDEPENDENT
DRESS: 2-->ASSISTIVE PERSON
FALL_HISTORY_WITHIN_LAST_SIX_MONTHS: NO

## 2017-12-15 NOTE — ED PROVIDER NOTES
History     Chief Complaint:  Vomiting    HPI   Dagoberto Rankin is a 89 year old anticoagulated male with PMH of P. Vera and atrail fibrillation who presents with concerns for bloody emesis. The patient is from [place] and is visiting his daughter here for the past 6 weeks. Today the patient had 1 episode of bloody emesis (1810) associated with diarrhea. He denies fever though had some diaphoresis. The patient denies abdominal pain currently though had some cramps earlier today with his symptoms. He denies headache, vision change, chest pain, leg swelling, and is at his baseline shortness of breath. He has had no prior episodes of bloody emesis. He denies recent pneumonia or antibiotics. The patient takes coumadin for atrial fibrillation. Of note, the patient states that he has polycythemia.     Allergies:  No Known Drug Allergies    Medications:    Toprol 25 mg  Zoloft 50 mg day  aspirin 81 mg  metformin 500 mg  prostar 5mg  coumadin 5mg (5 days a week) 7.5 2 days a week  vitamin d    Past Medical History:    Atrial fibrillation  Cancer  Diabetes  Hypertension  Myocardial infarction    Past Surgical History:    Appendectomy  Cardiac surgery    Family History:    The patient denies any relevant family medical history.    Social History:  The patient was accompanied to the ED by his daughter.  Smoking Status: former smoker  Smokeless Tobacco: none  Alcohol Use: yes    Marital Status:      Review of Systems   Constitutional: Positive for diaphoresis. Negative for fever.   Eyes: Negative for visual disturbance.   Respiratory: Positive for shortness of breath.         Chronic symptoms   Cardiovascular: Negative for chest pain and leg swelling.   Gastrointestinal: Positive for abdominal pain, diarrhea and vomiting. Negative for nausea.   Neurological: Negative for headaches.   All other systems reviewed and are negative.      Physical Exam   First Vitals:  BP: 109/76  Pulse: 86  Temp: 97.9  F (36.6  C)  Resp:  "12  Height: 160 cm (5' 3\")  Weight: 68 kg (150 lb)  SpO2: 98 %    Physical Exam  General: Alert, appears well-developed and well-nourished. Cooperative.     In mild distress  HEENT:  Head:  Atraumatic  Ears:  External ears are normal  Mouth/Throat:  Oropharynx is without erythema or exudate and mucous membranes are moist. Emesis dried around his mouth.   Eyes:   Conjunctivae normal and EOM are normal. No scleral icterus.    Pupils are equal, round, and reactive to light.   CV:  Normal rate, irregular rhythm, normal heart sounds and radial and dorsalis pedis pulses are 2+ and symmetric.  No murmur.  Resp:  Breath sounds are clear bilaterally    Non-labored, no retractions or accessory muscle use  GI:  Abdomen is soft, no distension, no tenderness. No rebound or guarding.  MS:  Normal range of motion. No edema.    Normal strength in all 4 extremities.     Back atraumatic.  Skin:  Warm and dry.  No rash or lesions noted.  Neuro:  Alert. Normal strength.  Sensation intact in all 4 extremities. GCS: 15  Psych:  Normal mood and affect.    Emergency Department Course   Imaging:  Radiographic findings were communicated with the patient who voiced understanding of the findings.  CT Abdomen/Pelvis with IV contrast:   1. Moderate elevation left hemidiaphragm.  2. Infrarenal abdominal aortic aneurysm measuring 4.0 cm in size.  3. Multiple bladder diverticuli.  4. Multiple colonic diverticula. There appears to be a small amount of  inflammation surrounding a few diverticula in the distal descending  colon for example axial series 2 images 45 and 46 that could represent  early or mild diverticulitis. Recommend clinical correlation.  As per radiology.    Laboratory:  Lactic acid (collected 1954): 1.8  CBC: WBC 32.8 (H), HGB 11.6 (L), HCT 39.7 (L), MCV 71 (L), MCH 20.8 (L), MCHC 29.2 (L), RDW 17.3 (H),  (H), Absolute  Neutrophil 27.7 (H), Absolute monocytes 1.4 (H), Abs immature granulocytes 0.7 (H), o/w WNL.   INR: 3.19 " (H)  BMP: Glucose 211 (H), BUN 58, o/w WNL (Creatinine: 0.89)  ABO/Rh type: B+  Hepatic panel: protein total 6.6 (L) o/w WNL    Interventions:  1954 NS, 1 L, IV  2015 Protonix, 40 mg, IV  2146 Aqua-mephyton, 2 mg, IV    Emergency Department Course:  Nursing notes and vitals reviewed. I performed an exam of the patient as documented above.     IV inserted. Medicine administered as documented above. Blood drawn. This was sent to the lab for further testing, results above.    The patient was sent for an abdominal CT while in the emergency department, findings above.     2002 I consulted with SELENE Simons, regarding the patient's history and presentation here in the emergency department.    2010 I rechecked the patient, spoke with the daughter, and obtained information regarding the patients medications and care system.    2122  I consulted with Dr. Keene of the hospitalist services. They are in agreement to accept the patient for admission.    Findings and plan explained to the Patient and daughter who consent to admission. Discussed the patient with Dr. Keene, who will admit the patient to an inpatient bed for further monitoring, evaluation, and treatment.    Impression & Plan    Medical Decision Making:  Patient is a 89-year-old male with past medical history of polycythemia vera, abdominal aortic aneurysm, and CAD who presents with a single episode of hematemesis approximately 30 minutes prior to arrival.  Patient has had no history of GI bleeds in the past.  Patient has never had an endoscopy.  Patient does have a history of polycythemia vera and this makes his CBC challenging to interpret.  Thankfully we are able to get care everywhere from patient's primary care facility in [place] through the [facility] and the patient does appear to have a chronic leukocytosis associated with his P vera.  Patient has a most recent WBC of 29 per his chart review, today his WBC is approximately 32.  I think this  is unlikely sepsis and unlikely indicative of a systemic infection.  Patient's single episode of coffee-ground emesis concerning for upper GI bleed.  I did discuss with Dr. Palomo who agreed to follow the patient and consider emergent endoscopy if necessary if his hemoglobin were to trend down. Patient has a hemoglobin of approximately 11.  This is decreased from his baseline around 13.5-14. Patient with no recent need for blood transfusions. He does occasionally get phlebotomy due to his polycythemia vera and this is recognized in his hematology notes from the [facility].  Patient is vitally stable here, afebrile with low concern for other infections.  Patient was having some diarrhea and crampy abdominal discomfort earlier today.  Out of concern for possible diverticulitis I did obtain a CT abdomen and pelvis.  Preliminary results the CT abdomen and pelvis show moderate elevation of the left hemidiaphragm a known AAA measuring 4 cm bladder diverticuli and colonic diverticula with some mild inflammation surrounding some diverticula in the descending colon. This may represent early diverticulitis. Patient on my abdominal pain recheck has no significant tenderness to the left lower quadrant and I will defer antibiotics to the inpatient team.  I discussed with Waleska Keene MD who agreed to admission for suspected upper GI bleeding continued serial abdominal exams determine if this may be early diverticulitis.  Patient understands need for admission and agreed.  Patient up to date on all CT imaging and lab findings.  Patient admitted    Diagnosis:    ICD-10-CM    1. Hematemesis, presence of nausea not specified K92.0    2. Upper GI bleed K92.2    3. Hx of polycythemia vera Z86.2        Disposition:  Admitted to the hospitalist service    Mango BROWN, am serving as a scribe on 12/14/2017 at 7:44 PM to personally document services performed by Madhav Mathur MD based on my observations and the provider's  statements to me.     Mango Garcia  12/14/2017    EMERGENCY DEPARTMENT       Madhav Mathur MD  12/14/17 1455

## 2017-12-15 NOTE — ED NOTES
"[facility]  ED Nurse Handoff Report    ED Chief complaint: Diarrhea (having diarrhea today but states that it is brown in color. while walking up stairs after using restroom got very sweaty and had a large coffee ground emesis. pt on coumadin) and Hematemesis      ED Diagnosis:   Final diagnoses:   None       Code Status: Per Hospitalist    Allergies: No Known Allergies    Activity level - Baseline/Home:  Independent    Activity Level - Current:   Independent     Needed?: No    Isolation: No  Infection: Not Applicable    Bariatric?: No    Vital Signs:   Vitals:    12/14/17 1844   BP: 109/76   Pulse: 86   Resp: 12   Temp: 97.9  F (36.6  C)   TempSrc: Oral   SpO2: 98%   Weight: 68 kg (150 lb)   Height: 1.6 m (5' 3\")       Cardiac Rhythm: ,        Pain level:      Is this patient confused?: No    Patient Report: Initial Complaint: Dagoberto Rankin is a 89 year old anticoagulated male who presents with concerns for bloody emesis. The patient is from [place] and is visitng his daughter here for the past 5 weeks. Today the patient had 1 episode of bloody emesis associated with diarrhea. He denies fever though had some diaphoresis. He denies headache, vision change, abdominal pain, chest pain, leg swelling, and is at his baseline shortness of breath. He has had no prior episodes of bloody emesis. He denies recent pneumonia or antibtiocs. The patietn takes coumadin for atrial fibrillation. Of note, the patient states that he has polycythemia   Focused Assessment: Resp: WNL, Cardiac: WNL, Neuro:WNL GI: multiple episodes of diarrhea today with one episode of dark red/coffee ground emesis. Denies pain and is not nauseated here  Tests Performed: basic labs, inr, type and screen, lactic, hepatic panel, ct abdomen  Abnormal Results:   Results for orders placed or performed during the hospital encounter of 12/14/17 (from the past 24 hour(s))   CBC with platelets + differential   Result Value Ref Range "    WBC 32.8 (H) 4.0 - 11.0 10e9/L    RBC Count 5.58 4.4 - 5.9 10e12/L    Hemoglobin 11.6 (L) 13.3 - 17.7 g/dL    Hematocrit 39.7 (L) 40.0 - 53.0 %    MCV 71 (L) 78 - 100 fl    MCH 20.8 (L) 26.5 - 33.0 pg    MCHC 29.2 (L) 31.5 - 36.5 g/dL    RDW 17.3 (H) 10.0 - 15.0 %    Platelet Count 764 (H) 150 - 450 10e9/L    Diff Method Automated Method     % Neutrophils 84.4 %    % Lymphocytes 7.5 %    % Monocytes 4.2 %    % Eosinophils 1.4 %    % Basophils 0.4 %    % Immature Granulocytes 2.1 %    Nucleated RBCs 0 0 /100    Absolute Neutrophil 27.7 (H) 1.6 - 8.3 10e9/L    Absolute Lymphocytes 2.5 0.8 - 5.3 10e9/L    Absolute Monocytes 1.4 (H) 0.0 - 1.3 10e9/L    Absolute Eosinophils 0.5 0.0 - 0.7 10e9/L    Absolute Basophils 0.1 0.0 - 0.2 10e9/L    Abs Immature Granulocytes 0.7 (H) 0 - 0.4 10e9/L    Absolute Nucleated RBC 0.0    INR   Result Value Ref Range    INR 3.19 (H) 0.86 - 1.14   Basic metabolic panel   Result Value Ref Range    Sodium 141 133 - 144 mmol/L    Potassium 5.0 3.4 - 5.3 mmol/L    Chloride 106 94 - 109 mmol/L    Carbon Dioxide 27 20 - 32 mmol/L    Anion Gap 8 3 - 14 mmol/L    Glucose 211 (H) 70 - 99 mg/dL    Urea Nitrogen 58 (H) 7 - 30 mg/dL    Creatinine 0.89 0.66 - 1.25 mg/dL    GFR Estimate 79 >60 mL/min/1.7m2    GFR Estimate If Black >90 >60 mL/min/1.7m2    Calcium 8.9 8.5 - 10.1 mg/dL   ABO/Rh type and screen   Result Value Ref Range    ABO B     RH(D) Pos     Antibody Screen Neg     Test Valid Only At [facility]        Specimen Expires 12/17/2017    Hepatic panel   Result Value Ref Range    Bilirubin Direct <0.1 0.0 - 0.2 mg/dL    Bilirubin Total 0.4 0.2 - 1.3 mg/dL    Albumin 3.4 3.4 - 5.0 g/dL    Protein Total 6.6 (L) 6.8 - 8.8 g/dL    Alkaline Phosphatase 64 40 - 150 U/L    ALT 23 0 - 70 U/L    AST 16 0 - 45 U/L   Lactic acid   Result Value Ref Range    Lactic Acid 1.8 0.7 - 2.0 mmol/L       Treatments provided: fluids, iv protonix, phytonadione 2mg    Family Comments:  daughter    OBS brochure/video discussed/provided to patient: Yes    ED Medications:   Medications   phytonadione (AQUA-MEPHYTON) 2 mg in NaCl 0.9 % 50 mL intermittent infusion (not administered)   iopamidol (ISOVUE-370) solution 75 mL (not administered)   100mL Saline Flush (not administered)   0.9% sodium chloride BOLUS (1,000 mLs Intravenous New Bag 12/14/17 1954)   pantoprazole (PROTONIX) 40 mg IV push injection (40 mg Intravenous Given 12/14/17 2015)       Drips infusing?:  No      ED NURSE PHONE NUMBER: [phone]

## 2017-12-15 NOTE — H&P
DATE OF ADMISSION:  12/14/2017      PRIMARY CARE PROVIDER:  Sabrina Pagan MD in [place], [place]      CHIEF COMPLAINT:  Hematemesis.      HISTORY OF PRESENT ILLNESS:  Mr. Dagoberto Rankin is a delightful 89-year-old  gentleman with a past medical history notable for coronary artery disease, hypertension, dyslipidemia, chronic atrial fibrillation, anticoagulated with warfarin, diabetes mellitus type 2, BPH, polycythemia vera with leukocytosis, abdominal aortic aneurysm, depression and bladder cancer who has presented to the emergency department after having hematemesis.  He is originally from [place] and has been visiting his daughter since November of this year.  Earlier around 6:00 p.m., he suddenly had an onset of nausea with subsequent vomiting of dark blood associated with feeling clammy.  The patient cannot quantify the amount of his vomiting.  He reports no chest pain, dyspnea, palpitations, lightheadedness, abdominal pain, melena or hematochezia.  He states that earlier in the morning he had a diarrheal stool but no melena or hematochezia.  He reports no urinary symptoms.      In the emergency department, the patient has been evaluated by the ER attending physician.  Hematology profile shows white count of 32.8 which is around his baseline.  Hemoglobin is 11.6 which is down from 13.6 in 10/2017.  Chemistry profile is remarkable for elevated BUN of 58 and normal creatinine of 0.89.  Lactic acid is 1.8 and normal.  The liver function panel is essentially unremarkable.  His INR is 3.19 and supratherapeutic.  CT scan of the abdomen/pelvis with contrast shows multiple colonic diverticula with a small amount of inflammation surrounding a few diverticula in the distal descending colon which could represent early or mild diverticulitis.  There is also moderate elevation of left diaphragm, infrarenal abdominal aortic aneurysm measuring 4 cm in size and multiple bladder diverticula.  In the  emergency department, the patient has received 2 mg of vitamin K.  He is being admitted to the hospital under inpatient status.  Of note, the patient reports no previous history of GI bleed.  He has never undergone colonoscopy in his life.  Besides warfarin he also takes aspirin but denies using other NSAIDs.  He drinks 1 alcoholic drink primarily vodka every evening.      PAST MEDICAL HISTORY:   1.  Coronary artery disease, status post CABG approximately 15 years ago.  The details are not available.   2.  Hypertension.   3.  Dyslipidemia.   4.  Chronic atrial fibrillation, on warfarin.   5.  Diabetes mellitus type 2.   6.  Bladder cancer.   7.  Depression.   8.  BPH.   9.  Polycythemia vera with leukocytosis with a baseline white count around 30,000.   10.  Abdominal aortic aneurysm, measuring 4.1 cm, infrarenal.      PAST SURGICAL HISTORY:   1.  CABG approximately 15 years ago.   2.  Appendectomy.   3.  Cataract surgery.      FAMILY HISTORY:  Negative for cancer.      SOCIAL HISTORY:  The patient is .  He denies smoking.  He drinks 1 alcoholic drink primarily vodka every evening.  He does use a walker for ambulation.  He lives in [place] and is currently visiting his daughter.      MEDICATIONS:   1.  Toprol-XL 25 mg p.o. daily.   2.  Zoloft 50 mg p.o. daily.   3.  Aspirin 81 mg p.o. daily.   4.  Metformin 500 mg p.o. daily.   5.  Proscar 5 mg p.o. daily.   6.  Vitamin D 2000 units p.o. daily.   7.  Warfarin 5 mg every Saturday, Monday, Wednesday, Thursday and Sunday and 7.5 mg every Tuesday and Friday.      ALLERGIES:  No known drug allergies.      REVIEW OF SYSTEMS:  A 10-point review of system was performed thoroughly and was negative except as stated in the history of present illness.      PHYSICAL EXAMINATION:   GENERAL:  This patient is a very pleasant elderly gentleman who is in no acute distress.   VITAL SIGNS:  Blood pressure 109/76, heart rate 85, respiration rate 12, temperature 97.9 degrees  Fahrenheit, oxygen saturation 98% on room air.   HEENT:  The pupils are round, equal, reactive to light bilaterally.  There is no conjunctival pallor or scleral icterus.  The oral mucosa appears slightly dry.   NECK:  Supple.  There is no elevated JVP.   LUNGS:  Clear to auscultation bilaterally.  No crackles, wheezing or rhonchi.   CARDIOVASCULAR:  There is normal S1 and S2 with irregularly irregular rhythm.  The rate is normal.   ABDOMEN:  Soft, nontender, nondistended.  Bowel sounds are present.  I could not appreciate any mass.   EXTREMITIES:  There is no calf tenderness or lower extremity edema.   SKIN:  There is no jaundice, cyanosis or rash.   NEUROLOGIC:  He is awake, alert and appears oriented x3.  There is no focal deficit on gross examination.      DATA:   1.  Chemistry profile:  Sodium 141, potassium 5, BUN 58, creatinine 0.89, calcium 8.9, total protein 6.6, albumin 3.4, total bilirubin 0.4, alkaline phosphatase 64, ALT 23, AST 16, total bilirubin is less than 0.1, lactic acid 1.8, glucose 211.   2.  Hematology profile:  White count 32.8, hemoglobin 11.6, platelet count 764,000 with differential of 84.4% neutrophils.   3.  INR 3.19.     4.  A CT scan of the abdomen/pelvis with contrast was reviewed in the history of present illness.      ASSESSMENT AND PLAN:  Mr. Dagoberto Rankin is a 89-year-old  gentleman with a past medical history notable for coronary artery disease, hypertension, dyslipidemia, BPH, bladder cancer, chronic atrial fibrillation, anticoagulated with warfarin, diabetes mellitus type 2, abdominal aortic aneurysm measuring 4.1 cm and depression who has presented with hematemesis.  He is being admitted to the hospital under inpatient status.   1.  Upper gastrointestinal bleeding:  The patient has presented with acute onset of hematemesis associated with diaphoresis.  His hemoglobin is 11.6 which is down from 13.6 in 10/2017.  The patient is on warfarin and his INR is  supratherapeutic at 3.19.  He also takes aspirin 81 mg p.o. daily.  I will hold his prior to admission aspirin and warfarin.  In the emergency department, the patient received 2 mg of IV vitamin K to reverse his INR.  His INR will be closely monitored as the patient might require further treatment with vitamin K.  I will keep the patient n.p.o.  I will place the patient on maintenance IV normal saline.  I will treat the patient empirically with Protonix 40 mg b.i.d.  Will monitor his hemoglobin every 6 hours.  He will be transfused for emoglobin less than 8 or any anemia-associated symptoms.  Consent was obtained in the emergency department in the presence of RN and his wife.  Dr. Palomo of  has already been notified and plan is for possible EGD in the morning.     2.  Acute blood loss anemia:  His hemoglobin is 11.6, down from 13.6 in 10/2017.  This is due to upper GI bleed.  Management is outlined above.   3.  Chronic atrial fibrillation:  The rate is controlled.  He is on warfarin and his INR is supratherapeutic at 3.19.  It will be reversed with vitamin K as above.  I will keep the patient on telemetry.  I will hold his prior to admission metoprolol XL 25 mg p.o. daily due to the potential risk of hypotension in view of upper GI bleed.  I will have IV metoprolol available for heart rate more than 120.     4.  Coronary artery disease:  The patient is status post CABG approximately 15 years ago.  The details are not available.  At this juncture, he has no chest pain/angina.  I will hold his prior to admission aspirin and Toprol-XL as outlined above.  Will resume when able.   5.  Diabetes mellitus type 2.  At home he is on metformin 500 mg p.o. daily.  His blood glucose is 211 in the emergency department.  I will place the patient on a sliding scale NovoLog.  I will also check hemoglobin A1c.   6.  Hypertension:  The blood pressure is currently stable at 109/76.  Due to upper GI bleed, he is at the potential risk  of hypotension.  Therefore, I will hold his prior to admission metoprolol XL 25 mg p.o. daily.  I will have IV labetalol available for systolic blood pressure more than 170.   7.  BPH:  At home he is on Proscar 5 mg p.o. daily.  This will be held for now.  There is a potential risk of hypotension in the setting of GI bleed.   8.  Depression:  He will continue with prior to admission Zoloft 50 mg p.o. daily.   9.  Abdominal aortic aneurysm:  The patient has infrarenal abdominal aortic aneurysm measuring around 4 cm which appears to be stable on the basis of findings on the CT scan of the abdomen/pelvis with contrast done in the emergency department.   10.  Abnormal CT findings:  CT scan of the abdomen/pelvis in the emergency department shows multiple colonic diverticula, multiple bladder diverticula, and small amount of inflammation surrounding a few diverticula in the distal descending colon which could represent early or mild diverticulitis.  On examination, the abdomen is benign and the patient has no pain or discomfort.  He has never had colonoscopy in his life.  I will defer further management to the GI Service.   11.  Deep venous thrombosis prophylaxis:  Pneumatic compression while we are reversing his INR.      CODE STATUS:  It was discussed and a full code was established.      DISPOSITION:  I anticipate at least 48 hours of hospital stay.      I would like to thank Dr. Sabrina Pagan for allowing the hospitalist service to participate in the care of this patient.         TESFAYE ALBARADO MD             D: 2017 22:05   T: 2017 23:06   MT:       Name:     GAVIOTA HUIZAR   MRN:      [id number]        Account:      [id number]   :      1920           Admitted:     [id number]      Document: [id number]       cc: Copy for Provider        Sabrina Pagan MD

## 2017-12-15 NOTE — PHARMACY-ADMISSION MEDICATION HISTORY
Admission medication history interview status for the 12/14/2017  admission is complete. See EPIC admission navigator for prior to admission medications     Medication history source reliability:Good    Actions taken by pharmacist (provider contacted, etc):None     Additional medication history information not noted on PTA med list :None    Medication reconciliation/reorder completed by provider prior to medication history? No    Time spent in this activity: 30 minutes      Prior to Admission medications    Medication Sig Last Dose Taking? Auth Provider   Metoprolol Succinate (TOPROL XL PO) Take 25 mg by mouth daily 12/14/2017 at AM Yes Unknown, Entered By History   Sertraline HCl (ZOLOFT PO) Take 50 mg by mouth 12/13/2017 at PM Yes Unknown, Entered By History   aspirin 81 MG EC tablet Take 81 mg by mouth daily 12/14/2017 at AM Yes Unknown, Entered By History   METFORMIN HCL PO Take 500 mg by mouth daily (with breakfast) 12/13/2017 at Unknown time Yes Unknown, Entered By History   Finasteride (PROSCAR PO) Take 5 mg by mouth daily 12/13/2017 at PM Yes Unknown, Entered By History   VITAMIN D, CHOLECALCIFEROL, PO Take 2,000 Units by mouth daily 12/14/2017 at Unknown time Yes Unknown, Entered By History   Warfarin Sodium (COUMADIN PO) Take 5 mg by mouth Take 5mg on Sunday, Monday, Wednesday, Thursday, and Saturday. 12/13/2017 at Unknown time Yes Unknown, Entered By History   Warfarin Sodium (COUMADIN PO) Take 7.5 mg by mouth Take 7.5mg on Tuesday and Friday. 12/12/2017 at unknown Yes Unknown, Entered By History

## 2017-12-15 NOTE — PROGRESS NOTES
[facility]    Hospitalist Progress Note    Date of Service (when I saw the patient): 12/15/2017    Assessment & Plan   Mr. Rankin is a markedly pleasant 89 year old gentleman with CAD status post CABG, chronic atrial fibrillation on Warfarin, Type 2 Diabetes mellitus, polycythemia vera on steroids causing chronic leukocytosis, AAA, hypertension, dyslipidemia, chronic depression and bladder cancer who was admitted 12/14/2017 for hematemesis.    1) Hematemesis causing acute blood los anemia: Mr. Rankin developed acute onset nausea and vomiting dark emesis on the evening of admission. He has not been hypotensive thus far. HGB has dropped from 11.8 to 10.2. INR was 3.19 on admission. CT showed possible mild early diverticulitis. Lactate and WBC have been normal. Overall, we suspect a possible upper GI source exacerbated by supra-therapeutic INR. He has no abdominal pain, fever, diarrhea, hematochezia, or other signs of diverticulitis at the present time but we will monitor for that.     -- INR Reversed with 2 mg Vitamin K     -- GI consulted and plan for EGD this afternoon     -- Serial HGB checks; holding transfusions for now (will transfuse for less than 8)      -- Cautious IV fluid 100 ml/hour     -- He continues PPI, IV BID    2) Chronic afib: Warfarin reversed as above     -- Holding PO Toprol for now, IV Metoprolol available as needed    3) CAD: Holding aspirin for now pending endoscopy results    4) Type 2 Diabetes mellitus: A1c 7. Holding Metformin.  ISS insulin while hospitalized.    5)  Bladder cancer: Details unknown at present.      -- Resume Proscar at discharge    6) Chronic depression: He continues Zoloft    DVT Prophylaxis: Pneumatic Compression Devices  Code Status: Full Code    Disposition: Expected discharge in 1-2 days pending EGD results    Naveen Morejon MD    Interval History   Seen and examined this morning prior to procedure; feeling well, denies pain or nausea or  further bleeding. No other acute complaints at present.    -Data reviewed today: I reviewed all new labs and imaging results over the last 24 hours. I personally reviewed no images or EKG's today.    Physical Exam   Temp: 97.2  F (36.2  C) Temp src: Oral BP: 125/62 Pulse: 91 Heart Rate: 67 Resp: 16 SpO2: 97 % O2 Device: None (Room air)    Vitals:    12/14/17 1844   Weight: 68 kg (150 lb)     Vital Signs with Ranges  Temp:  [96.4  F (35.8  C)-98.7  F (37.1  C)] 97.2  F (36.2  C)  Pulse:  [86-91] 91  Heart Rate:  [67-93] 67  Resp:  [12-18] 16  BP: (109-142)/(51-76) 125/62  SpO2:  [95 %-98 %] 97 %  I/O last 3 completed shifts:  In: 698 [I.V.:698]  Out: 550 [Urine:550]    Constitutional: Alert and oriented to person, place and time; no apparent distress  HEENT: normocephalic moist mucus membranes  Respiratory: lungs clear to auscultation bilaterally  Cardiovascular: regular S1 S2 this morning  GI: abdomen soft non tender non distended bowel sounds positive  Skin: pale, good turgor  Musculoskeletal: no clubbing, cyanosis or edema  Neuro: EOMI; moves all four extremities  Psych: Appropriate affect, insight and judgment      Medications     NaCl 100 mL/hr at 12/15/17 0337       pantoprazole  40 mg Intravenous BID     insulin aspart  1-6 Units Subcutaneous Q4H     sertraline (ZOLOFT) tablet 50 mg  50 mg Oral Daily       Data     Recent Labs  Lab 12/15/17  0535 12/15/17  0025 12/14/17  1851   WBC 27.8*  --  32.8*   HGB 11.8* 10.7* 11.6*   MCV 70*  --  71*   *  --  764*   INR 1.56*  --  3.19*     --  141   POTASSIUM 4.4  --  5.0   CHLORIDE 113*  --  106   CO2 24  --  27   BUN 57*  --  58*   CR 0.72  --  0.89   ANIONGAP 7  --  8   LINDA 8.5  --  8.9   *  --  211*   ALBUMIN  --   --  3.4   PROTTOTAL  --   --  6.6*   BILITOTAL  --   --  0.4   ALKPHOS  --   --  64   ALT  --   --  23   AST  --   --  16       Recent Results (from the past 24 hour(s))   CT Abdomen Pelvis w Contrast    Narrative    CT ABDOMEN PELVIS  WITH CONTRAST 12/14/2017 9:14 PM     HISTORY: Abdominal pain, diarrhea, episode of hematemesis this  evening.     TECHNIQUE: 75 mL Isovue -370. Radiation dose for this scan was reduced  using automated exposure control, adjustment of the mA and/or kV  according to patient size, or iterative reconstruction technique.    COMPARISON: None.    FINDINGS: Moderate elevation left hemidiaphragm.    The liver, gallbladder and pancreas are unremarkable. Mildly enlarged  spleen. No adrenal lesions. Kidneys are normal. No retroperitoneal  adenopathy. There is an infrarenal abdominal aortic aneurysm with an  AP diameter of 4.0 cm.    There are multiple bladder diverticuli. Multiple colonic diverticula.  There appears to be a small amount of inflammation surrounding some  diverticula in the distal descending colon for example axial series 2  images 45 and 46 that could represent mild or early diverticulitis.  The appendix is not definitely seen but there are no inflammatory  changes in the right lower quadrant. No dilated or thickened bowel. No  free air or free fluid. Small bilateral inguinal hernias containing  only fat.      Impression    IMPRESSION:  1. Moderate elevation left hemidiaphragm.  2. Infrarenal abdominal aortic aneurysm measuring 4.0 cm in size.  3. Multiple bladder diverticuli.  4. Multiple colonic diverticula. There appears to be a small amount of  inflammation surrounding a few diverticula in the distal descending  colon for example axial series 2 images 45 and 46 that could represent  early or mild diverticulitis. Recommend clinical correlation.     STACY HICKMAN MD

## 2017-12-15 NOTE — ED NOTES
Bed: ED22  Expected date: 12/14/17  Expected time: 6:26 PM  Means of arrival: Ambulance  Comments:  El [facility]M GI Bleed  [id number]

## 2017-12-15 NOTE — PLAN OF CARE
Problem: Patient Care Overview  Goal: Plan of Care/Patient Progress Review  Outcome: Improving    No major changes overnight. A/Ox4, forgetful. VSS on RA. Tele: SR w/ 1st degree AV block. Denies pain/nausea. No emesis or stools overnight. Voiding per urinal, up w/ SBA/walker. Bed alarm activated, pt does not call appropriately. New PIV placed, infusing. Hgb q6hrs, last 10.7. Plan pending GI consult. Will continue to monitor and support.

## 2017-12-15 NOTE — CONSULTS
[facility]  Gastroenterology Consultation         Dagoberto Rankin  [address]  [facility] [address]  89 year old male    Admission Date/Time: 12/14/2017  Primary Care Provider: System, Provider Not In  Referring / Attending Physician:  Dr Waleska Keene    We were asked to see the patient in consultation by Dr. Keene for evaluation of upper GI bleed, hematemesis      CC: Hematemesis     HPI:  Dagoberto Rankin is a 89 year old male who  MrBeth Rankin is a delightful 89-year-old  gentleman with a past medical history of coronary artery disease, hypertension, dyslipidemia, chronic atrial fibrillation, anticoagulated with warfarin, diabetes mellitus type 2, BPH, polycythemia vera with leukocytosis, abdominal aortic aneurysm, depression and bladder cancer who has presented to the emergency department after having hematemesis.    Had onset of nausea with subsequent vomiting of dark blood associated with sweating, clammy and diaphoretic in the evening of 12/14/2017.  He reports no previous episodes of GI bleeding and has never had a colonoscopy in his life.     Hematology profile shows white count of 27.8 which is around his baseline.  Hemoglobin was 11.6 Now 11.8, which is down from 13.6 in 10/2017.  Chemistry profile is remarkable with the elevated BUN of 58 and normal creatinine of 0.89.  The liver function panel is essentially unremarkable.  CT scan of the abdomen/pelvis with contrast shows multiple colonic diverticula with a small amount of inflammation surrounding a few diverticula in the distal descending colon which could represent early or mild diverticulitis.   In the emergency department, the patient has received 2 mg of vitamin K.  His INR is now 1.56 from 3.19.  Of note, the patient reports no previous history of GI bleed.     He drinks 1-2 alcoholic drink apparently vodka every evening.     ROS: A comprehensive ten point review of systems was negative aside from those  in mentioned in the HPI.      PAST MED HX:  I have reviewed this patient's medical history and updated it with pertinent information if needed.   Past Medical History:   Diagnosis Date     Atrial fibrillation (H)      Cancer (H)     bladder cancer     Diabetes (H)      Hypertension      Myocardial infarction        MEDICATIONS:   Prior to Admission Medications   Prescriptions Last Dose Informant Patient Reported? Taking?   Finasteride (PROSCAR PO) 12/13/2017 at PM Other Yes Yes   Sig: Take 5 mg by mouth daily   METFORMIN HCL PO 12/13/2017 at Unknown time Other Yes Yes   Sig: Take 500 mg by mouth daily (with breakfast)   Metoprolol Succinate (TOPROL XL PO) 12/14/2017 at AM Self Yes Yes   Sig: Take 25 mg by mouth daily   Sertraline HCl (ZOLOFT PO) 12/13/2017 at PM Self Yes Yes   Sig: Take 50 mg by mouth   VITAMIN D, CHOLECALCIFEROL, PO 12/14/2017 at Unknown time Other Yes Yes   Sig: Take 2,000 Units by mouth daily   Warfarin Sodium (COUMADIN PO) 12/13/2017 at Unknown time Other Yes Yes   Sig: Take 5 mg by mouth Take 5mg on Sunday, Monday, Wednesday, Thursday, and Saturday.   Warfarin Sodium (COUMADIN PO) 12/12/2017 at unknown Other Yes Yes   Sig: Take 7.5 mg by mouth Take 7.5mg on Tuesday and Friday.   aspirin 81 MG EC tablet 12/14/2017 at AM Other Yes Yes   Sig: Take 81 mg by mouth daily      Facility-Administered Medications: None       ALLERGIES: No Known Allergies    SOCIAL HISTORY:  Social History   Substance Use Topics     Smoking status: Former Smoker     Smokeless tobacco: Not on file     Alcohol use Yes      Comment: 1 drink a day       FAMILY HISTORY:  No family history on file.    PHYSICAL EXAM:   General  Oriented, alert, denies pain, no distress  Vital Signs with Ranges  Temp: 98.7  F (37.1  C) Temp src: Oral BP: 142/72 Pulse: 91 Heart Rate: 78 Resp: 18 SpO2: 96 % O2 Device: None (Room air)    I/O last 3 completed shifts:  In: 698 [I.V.:698]  Out: 550 [Urine:550]    Constitutional: healthy, alert and no  distress   Cardiovascular: negative, PMI normal. No lifts, heaves, or thrills. RRR. No murmurs, clicks gallops or rub  Respiratory: negative, Percussion normal. Good diaphragmatic excursion. Lungs clear  Abdomen: Abdomen soft, non-tender. BS normal. No masses, organomegaly          ADDITIONAL COMMENTS:   I reviewed the patient's new clinical lab test results.   Recent Labs   Lab Test  12/15/17   0535  12/15/17   0025  12/14/17   1851   WBC  27.8*   --   32.8*   HGB  11.8*  10.7*  11.6*   MCV  70*   --   71*   PLT  620*   --   764*   INR  1.56*   --   3.19*     Recent Labs   Lab Test  12/15/17   0535  12/14/17   1851   POTASSIUM  4.4  5.0   CHLORIDE  113*  106   CO2  24  27   BUN  57*  58*   ANIONGAP  7  8     Recent Labs   Lab Test  12/14/17   1851   ALBUMIN  3.4   BILITOTAL  0.4   ALT  23   AST  16       I reviewed the patient's new imaging results.        CONSULTATION ASSESSMENT AND PLAN:    Active Problems:    GI bleed    Assessment: GI bleed    Plan: Discussed with family (son Roger) patients lab results and plan of care. Will plan serial hemoglobin every 8 hours. INR now 1.56.  Pt and son are agreeable to EGD today.   NPO  Continue with PPI              Asia Koenig PA-C, FACP  [facility].  Office: [phone]  Cell : [phone] (Dr. Palomo)  Cell: [phone] (Asia Koenig PA-C)

## 2017-12-15 NOTE — PLAN OF CARE
Problem: Patient Care Overview  Goal: Plan of Care/Patient Progress Review  Outcome: Improving  A/Ox4. Forgetful. Impulsive to stand up at bedside to urinate. Education given to call for assistance. SBA for mobility. Tele SR. NPO except for meds for EGD at 4pm. IVF infusing.  and 137. Voiding with urinal. Denies passing flatus. BS hypoactive. Denies nausea. Rest between cares. Afebrile. Vss. Discharge pending.     [id number]  EGD done. Gastric ulcer cauterized. Tolerating clears without nausea. No swallowing difficulty noted. BS active. . hgb 10.2 and 9.9.

## 2017-12-16 VITALS
SYSTOLIC BLOOD PRESSURE: 130 MMHG | HEIGHT: 63 IN | BODY MASS INDEX: 26.58 KG/M2 | OXYGEN SATURATION: 95 % | HEART RATE: 82 BPM | WEIGHT: 150 LBS | RESPIRATION RATE: 16 BRPM | TEMPERATURE: 96.6 F | DIASTOLIC BLOOD PRESSURE: 66 MMHG

## 2017-12-16 LAB
ERYTHROCYTE [DISTWIDTH] IN BLOOD BY AUTOMATED COUNT: 17.4 % (ref 10–15)
GLUCOSE BLDC GLUCOMTR-MCNC: 125 MG/DL (ref 70–99)
GLUCOSE BLDC GLUCOMTR-MCNC: 130 MG/DL (ref 70–99)
GLUCOSE BLDC GLUCOMTR-MCNC: 150 MG/DL (ref 70–99)
HCT VFR BLD AUTO: 32.8 % (ref 40–53)
HGB BLD-MCNC: 9.3 G/DL (ref 13.3–17.7)
HGB BLD-MCNC: 9.5 G/DL (ref 13.3–17.7)
INR PPP: 1.42 (ref 0.86–1.14)
MCH RBC QN AUTO: 20.3 PG (ref 26.5–33)
MCHC RBC AUTO-ENTMCNC: 29 G/DL (ref 31.5–36.5)
MCV RBC AUTO: 70 FL (ref 78–100)
PLATELET # BLD AUTO: 588 10E9/L (ref 150–450)
RBC # BLD AUTO: 4.67 10E12/L (ref 4.4–5.9)
WBC # BLD AUTO: 25.6 10E9/L (ref 4–11)

## 2017-12-16 PROCEDURE — 85610 PROTHROMBIN TIME: CPT | Performed by: HOSPITALIST

## 2017-12-16 PROCEDURE — 00000146 ZZHCL STATISTIC GLUCOSE BY METER IP

## 2017-12-16 PROCEDURE — 36415 COLL VENOUS BLD VENIPUNCTURE: CPT | Performed by: HOSPITALIST

## 2017-12-16 PROCEDURE — 85027 COMPLETE CBC AUTOMATED: CPT | Performed by: HOSPITALIST

## 2017-12-16 PROCEDURE — 85018 HEMOGLOBIN: CPT | Performed by: INTERNAL MEDICINE

## 2017-12-16 PROCEDURE — 36415 COLL VENOUS BLD VENIPUNCTURE: CPT | Performed by: INTERNAL MEDICINE

## 2017-12-16 PROCEDURE — 25000132 ZZH RX MED GY IP 250 OP 250 PS 637: Performed by: INTERNAL MEDICINE

## 2017-12-16 PROCEDURE — 25000132 ZZH RX MED GY IP 250 OP 250 PS 637: Performed by: HOSPITALIST

## 2017-12-16 PROCEDURE — 25000128 H RX IP 250 OP 636: Performed by: INTERNAL MEDICINE

## 2017-12-16 PROCEDURE — 99239 HOSP IP/OBS DSCHRG MGMT >30: CPT | Performed by: HOSPITALIST

## 2017-12-16 RX ORDER — PANTOPRAZOLE SODIUM 40 MG/1
40 TABLET, DELAYED RELEASE ORAL EVERY MORNING
Status: DISCONTINUED | OUTPATIENT
Start: 2017-12-16 | End: 2017-12-16 | Stop reason: HOSPADM

## 2017-12-16 RX ORDER — PANTOPRAZOLE SODIUM 40 MG/1
40 TABLET, DELAYED RELEASE ORAL EVERY MORNING
Qty: 30 TABLET | Refills: 0 | Status: SHIPPED | OUTPATIENT
Start: 2017-12-16 | End: 2018-01-15

## 2017-12-16 RX ADMIN — SERTRALINE HYDROCHLORIDE 50 MG: 50 TABLET ORAL at 09:52

## 2017-12-16 RX ADMIN — PANTOPRAZOLE SODIUM 40 MG: 40 TABLET, DELAYED RELEASE ORAL at 09:52

## 2017-12-16 RX ADMIN — SODIUM CHLORIDE: 9 INJECTION, SOLUTION INTRAVENOUS at 01:22

## 2017-12-16 NOTE — PLAN OF CARE
"Problem: Patient Care Overview  Goal: Plan of Care/Patient Progress Review  Outcome: Improving  A/Ox4 but forgetful. Denies pain. Denies nausea. No emesis. No stools. BS active. Passing flatus. Voiding with bedside urinal. Tolerating CL diet. Discharge pending, daughter updated.  Plan: most like dc to \"home\" (daughter's house) tomorrow (sunday)- will need medical okay to fly on Wed; nursing will continue to monitor.      "

## 2017-12-16 NOTE — PROGRESS NOTES
Discharge to daughter's house at 1140am. Escorted via wheelchair. A/Ox4. BS active. Passing flatus. Tolerating full liquid well. Denies nausea. Independently ambulating to bathroom with walker. Discharge summary reviewed with patient and his daughter. All question answered. protonix given to pt's daughter. All belongings packed and send with patient. Afebrile. Vss.

## 2017-12-16 NOTE — DISCHARGE SUMMARY
[facility]    Discharge Summary  Hospitalist    Date of Admission:  12/14/2017  Date of Discharge:  12/16/2017  Discharging Provider: Naveen Morejon MD  Date of Service (when I saw the patient): 12/16/17    Discharge Diagnoses   1) Hematemesis due to gastric ulcer causing acute blood loss anemia    History of Present Illness   Mr. Rankin is a markedly pleasant 89 year old gentleman from [place] with CAD status post CABG, chronic atrial fibrillation on Warfarin, Type 2 Diabetes mellitus, polycythemia vera on steroids causing chronic leukocytosis, AAA, hypertension, dyslipidemia, chronic depression and bladder cancer who was admitted 12/14/2017 for hematemesis. Please see the H and P for complete details of the initial presentation.     Hospital Course   Dagoberto Rankin was admitted on 12/14/2017.  The following problems were addressed during his hospitalization:    1) Hematemesis causing acute blood loss anemia: Mr. Rankin developed acute onset nausea and vomiting dark emesis on the evening of admission. He has had no abdominal pain, fever, diarrhea, or hematochezia. He has not been hypotensive since his presentation. HGB has dropped from 11.8 to 10.2 since admission. INR was 3.19 on admission. CT showed possible mild early diverticulitis. Lactate and WBC have been normal.       -- INR Reversed with 2 mg Vitamin K. No further bleeding was seen since hospitalization.     -- GI consulted and EGD showed a non- bleeding gastric ulcer with adherent clot. This was cauterized.     --He received IV fluid resuscitation. Transfusions were not needed      -- His diet was advanced successfully     -- He will continue daily PPI for at least 2 months. He will be discharged today and go back to [place] this week to see his PCP; Warfarin and aspirin were held at discharge and should not be resumed until a follow up EGD which should be done in 2 months to confirm resolution of the ulcer     2) Chronic  "afib: Warfarin reversed as above     -- He continues beta blockade,     3) CAD: He has remote history of CABG. Aspirin held at discharge for GI bleeding as above.    4) Type 2 Diabetes mellitus: A1c 7. He will continue Metformin at discharge     5)  Bladder cancer: Details unknown to me at the present time.      -- He will resume Proscar at discharge     6) Chronic depression: He continues Zoloft    EGD Results:  \" Impression:               - Normal esophagus.                             - Non-bleeding gastric ulcer with adherent clot.                             Treated with bipolar cautery.                             - Normal duodenal bulb, first portion of the                             duodenum and second portion of the duodenum.                             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                             - No specimens collected.   Recommendation:           - Use Protonix (pantoprazole) 40 mg PO daily.                             - Full liquid diet - advance as tolerated.                             - Return patient to hospital [facility].                             - Use Protonix (pantoprazole) 40 mg PO daily.                             - Hold coumadin from now.                             - Repeat upper endoscopy in 2 months to check                             healing. \"    Naveen Morejon MD    Code Status   Full Code       Primary Care Physician   Provider Not In System    Blood pressure 130/66, pulse 82, temperature 96.6  F (35.9  C), temperature source Oral, resp. rate 16, height 1.6 m (5' 3\"), weight 68 kg (150 lb), SpO2 95 %.    Constitutional: Alert and oriented to person, place and time; no apparent distress  HEENT: normocephalic moist mucous membranes  Respiratory: lungs clear to auscultation bilaterally  Cardiovascular: regular S1 S2 on my exam today  GI: abdomen soft non tender non distended bowel sounds " positive  Lymph/Hematologic: pale, no cervical lymphadenopathy  Skin: no rash, good turgor  Musculoskeletal: no clubbing, cyanosis or edema  Neurologic: extra-ocular muscles intact; moves all four extremities  Psychiatric: appropriate affect, speech non-tangential    Discharge Disposition   Discharged to home  Condition at discharge: Good    Consultations This Hospital Stay   PHARMACY TO DOSE PHYTONADIONE  GASTROENTEROLOGY IP CONSULT    Time Spent on this Encounter   I, Naveen Morejon, personally saw the patient today and spent greater than 30 minutes discharging this patient.    Discharge Orders     Reason for your hospital stay   For treatment of upper intestinal bleeding due to a gastric ulcer.     Follow-up and recommended labs and tests    Follow up with primary care provider, Provider Not In System, within 7 days for hospital follow- up.  The following labs/tests are recommended: CBC.     Activity   Your activity upon discharge: activity as tolerated     Full Code     Diet   Follow this diet upon discharge: Orders Placed This Encounter     Advance Diet as Tolerated: Full Liquid Diet       Discharge Medications   Current Discharge Medication List      START taking these medications    Details   pantoprazole (PROTONIX) 40 MG EC tablet Take 1 tablet (40 mg) by mouth every morning  Qty: 30 tablet, Refills: 0    Associated Diagnoses: Gastrointestinal hemorrhage associated with gastric ulcer         CONTINUE these medications which have NOT CHANGED    Details   Metoprolol Succinate (TOPROL XL PO) Take 25 mg by mouth daily      Sertraline HCl (ZOLOFT PO) Take 50 mg by mouth      METFORMIN HCL PO Take 500 mg by mouth daily (with breakfast)      Finasteride (PROSCAR PO) Take 5 mg by mouth daily      VITAMIN D, CHOLECALCIFEROL, PO Take 2,000 Units by mouth daily         STOP taking these medications       aspirin 81 MG EC tablet Comments:   Reason for Stopping:         Warfarin Sodium (COUMADIN PO) Comments:    Reason for Stopping:         Warfarin Sodium (COUMADIN PO) Comments:   Reason for Stopping:             Allergies   No Known Allergies  Data   Most Recent 3 CBC's:  Recent Labs   Lab Test  12/16/17   0615  12/16/17   0038  12/15/17   1847   12/15/17   0535   12/14/17   1851   WBC  25.6*   --    --    --   27.8*   --   32.8*   HGB  9.5*  9.3*  9.9*   < >  11.8*   < >  11.6*   MCV  70*   --    --    --   70*   --   71*   PLT  588*   --    --    --   620*   --   764*    < > = values in this interval not displayed.      Most Recent 3 BMP's:  Recent Labs   Lab Test  12/15/17   0535  12/14/17   1851   NA  144  141   POTASSIUM  4.4  5.0   CHLORIDE  113*  106   CO2  24  27   BUN  57*  58*   CR  0.72  0.89   ANIONGAP  7  8   LINDA  8.5  8.9   GLC  159*  211*     Most Recent 2 LFT's:  Recent Labs   Lab Test  12/14/17   1851   AST  16   ALT  23   ALKPHOS  64   BILITOTAL  0.4     Most Recent INR's and Anticoagulation Dosing History:  Anticoagulation Dose History     Recent Dosing and Labs Latest Ref Rng & Units 12/14/2017 12/15/2017 12/16/2017    INR 0.86 - 1.14 3.19(H) 1.56(H) 1.42(H)        Most Recent 3 Troponin's:No lab results found.  Most Recent Cholesterol Panel:No lab results found.  Most Recent 6 Bacteria Isolates From Any Culture (See EPIC Reports for Culture Details):No lab results found.  Most Recent TSH, T4 and A1c Labs:  Recent Labs   Lab Test  12/15/17   0025   A1C  7.0*     Results for orders placed or performed during the hospital encounter of 12/14/17   CT Abdomen Pelvis w Contrast    Narrative    CT ABDOMEN PELVIS WITH CONTRAST 12/14/2017 9:14 PM     HISTORY: Abdominal pain, diarrhea, episode of hematemesis this  evening.     TECHNIQUE: 75 mL Isovue -370. Radiation dose for this scan was reduced  using automated exposure control, adjustment of the mA and/or kV  according to patient size, or iterative reconstruction technique.    COMPARISON: None.    FINDINGS: Moderate elevation left hemidiaphragm.    The  liver, gallbladder and pancreas are unremarkable. Mildly enlarged  spleen. No adrenal lesions. Kidneys are normal. No retroperitoneal  adenopathy. There is an infrarenal abdominal aortic aneurysm with an  AP diameter of 4.0 cm.    There are multiple bladder diverticuli. Multiple colonic diverticula.  There appears to be a small amount of inflammation surrounding some  diverticula in the distal descending colon for example axial series 2  images 45 and 46 that could represent mild or early diverticulitis.  The appendix is not definitely seen but there are no inflammatory  changes in the right lower quadrant. No dilated or thickened bowel. No  free air or free fluid. Small bilateral inguinal hernias containing  only fat.      Impression    IMPRESSION:  1. Moderate elevation left hemidiaphragm.  2. Infrarenal abdominal aortic aneurysm measuring 4.0 cm in size.  3. Multiple bladder diverticuli.  4. Multiple colonic diverticula. There appears to be a small amount of  inflammation surrounding a few diverticula in the distal descending  colon for example axial series 2 images 45 and 46 that could represent  early or mild diverticulitis. Recommend clinical correlation.     STACY HICKMAN MD

## 2017-12-16 NOTE — PLAN OF CARE
Problem: Gastrointestinal Bleeding (Adult)  Goal: Signs and Symptoms of Listed Potential Problems Will be Absent, Minimized or Managed (Gastrointestinal Bleeding)  Signs and symptoms of listed potential problems will be absent, minimized or managed by discharge/transition of care (reference Gastrointestinal Bleeding (Adult) CPG).   Outcome: No Change  A&Ox4, forgetful. Bed alarm on, sets off to use urinal at bedside; urgency. Denies pain. VSS on RA. Tele afib CVR. Tolerating clear liquid diet. No emesis or stools. Q 4 hr BG checks, no coverage overnight. PIV infusing. 0000 hgb 9.3. Up SBA/walker when up. Pt appeared to rest comfortably throughout the night. Will continue to monitor. Plan: possible d/c AM.

## 2018-01-06 LAB — INTERPRETATION ECG - MUSE: NORMAL
